# Patient Record
Sex: MALE | Race: WHITE | URBAN - METROPOLITAN AREA
[De-identification: names, ages, dates, MRNs, and addresses within clinical notes are randomized per-mention and may not be internally consistent; named-entity substitution may affect disease eponyms.]

---

## 2020-12-02 ENCOUNTER — APPOINTMENT (RX ONLY)
Dept: URBAN - METROPOLITAN AREA CLINIC 66 | Facility: CLINIC | Age: 20
Setting detail: DERMATOLOGY
End: 2020-12-02

## 2020-12-02 DIAGNOSIS — L81.4 OTHER MELANIN HYPERPIGMENTATION: ICD-10-CM

## 2020-12-02 DIAGNOSIS — L40.0 PSORIASIS VULGARIS: ICD-10-CM

## 2020-12-02 PROCEDURE — ? PRESCRIPTION

## 2020-12-02 PROCEDURE — ? COUNSELING

## 2020-12-02 PROCEDURE — 99202 OFFICE O/P NEW SF 15 MIN: CPT

## 2020-12-02 PROCEDURE — ? FULL BODY SKIN EXAM - DECLINED

## 2020-12-02 RX ORDER — CALCIPOTRIENE AND BETAMETHASONE DIPROPIONATE 50; .5 UG/G; MG/G
AEROSOL, FOAM TOPICAL QD
Qty: 1 | Refills: 6 | Status: ERX | COMMUNITY
Start: 2020-12-02

## 2020-12-02 RX ADMIN — CALCIPOTRIENE AND BETAMETHASONE DIPROPIONATE: 50; .5 AEROSOL, FOAM TOPICAL at 00:00

## 2020-12-02 ASSESSMENT — LOCATION ZONE DERM
LOCATION ZONE: SCALP
LOCATION ZONE: FACE

## 2020-12-02 ASSESSMENT — LOCATION DETAILED DESCRIPTION DERM
LOCATION DETAILED: RIGHT INFERIOR CENTRAL MALAR CHEEK
LOCATION DETAILED: MID-FRONTAL SCALP

## 2020-12-02 ASSESSMENT — LOCATION SIMPLE DESCRIPTION DERM
LOCATION SIMPLE: ANTERIOR SCALP
LOCATION SIMPLE: RIGHT CHEEK

## 2022-10-04 ENCOUNTER — APPOINTMENT (RX ONLY)
Dept: URBAN - METROPOLITAN AREA CLINIC 77 | Facility: CLINIC | Age: 22
Setting detail: DERMATOLOGY
End: 2022-10-04

## 2022-10-04 DIAGNOSIS — L40.0 PSORIASIS VULGARIS: ICD-10-CM | Status: INADEQUATELY CONTROLLED

## 2022-10-04 PROCEDURE — 99204 OFFICE O/P NEW MOD 45 MIN: CPT

## 2022-10-04 PROCEDURE — ? PRESCRIPTION MEDICATION MANAGEMENT

## 2022-10-04 PROCEDURE — ? CHRONOLOGY OF PRESENT ILLNESS

## 2022-10-04 PROCEDURE — ? PRESCRIPTION

## 2022-10-04 PROCEDURE — ? COUNSELING

## 2022-10-04 RX ORDER — HYDROCORTISONE 25 MG/G
THIN LAYER CREAM TOPICAL BID
Qty: 30 | Refills: 2 | Status: ERX

## 2022-10-04 RX ORDER — CLOBETASOL PROPIONATE 0.5 MG/ML
THIN LAYER SOLUTION TOPICAL BID
Qty: 50 | Refills: 2 | Status: ERX

## 2022-10-04 ASSESSMENT — LOCATION DETAILED DESCRIPTION DERM
LOCATION DETAILED: LEFT SUPERIOR MEDIAL FOREHEAD
LOCATION DETAILED: POSTERIOR MID-PARIETAL SCALP
LOCATION DETAILED: LEFT MEDIAL MALAR CHEEK

## 2022-10-04 ASSESSMENT — LOCATION SIMPLE DESCRIPTION DERM
LOCATION SIMPLE: LEFT CHEEK
LOCATION SIMPLE: POSTERIOR SCALP
LOCATION SIMPLE: LEFT FOREHEAD

## 2022-10-04 ASSESSMENT — LOCATION ZONE DERM
LOCATION ZONE: FACE
LOCATION ZONE: SCALP

## 2022-10-04 ASSESSMENT — PGA PSORIASIS: PGA PSORIASIS 2020: MODERATE

## 2022-10-04 ASSESSMENT — BSA PSORIASIS: % BODY COVERED IN PSORIASIS: 3

## 2022-10-04 NOTE — PROCEDURE: CHRONOLOGY OF PRESENT ILLNESS
Chronology Of Present Illness: 10/4/22\\nPatient reports present for 3 years. He has tried Enstilar foam in the past for six months which helped, but stopped when he was on oral prednisone because it cleared it all up. He notes after stopping the prednisone he flared and it is now spreading to the face, ears, and minimally on the chest. On exam, diffuse erythematous plaques predominate and there are guttate papules on  forehead trailing down to his central face. Discussed treatment with topical steroid medications Clobetasol scalp solution and Hydrocortisone 2.5% cream for the face and ears. If flare still present at next visit will discuss biologics. Will re-examine in 1 month.
Detail Level: Zone

## 2022-10-04 NOTE — PROCEDURE: PRESCRIPTION MEDICATION MANAGEMENT
Initiate Treatment: clobetasol 0.05 % scalp solution. Apply thin layer to AA of the scalp BID x 2 weeks, then 2 times per week for maintenance\\n\\nhydrocortisone 2.5 % topical cream. Apply thin layer to affected area of face and ears BID x 2 weeks. Then decrease to PRN as needed for flares.
Detail Level: Zone
Render In Strict Bullet Format?: No

## 2022-11-07 ENCOUNTER — APPOINTMENT (RX ONLY)
Dept: URBAN - METROPOLITAN AREA CLINIC 77 | Facility: CLINIC | Age: 22
Setting detail: DERMATOLOGY
End: 2022-11-07

## 2022-11-07 DIAGNOSIS — L21.8 OTHER SEBORRHEIC DERMATITIS: ICD-10-CM

## 2022-11-07 DIAGNOSIS — L40.0 PSORIASIS VULGARIS: ICD-10-CM

## 2022-11-07 PROCEDURE — ? PRESCRIPTION MEDICATION MANAGEMENT

## 2022-11-07 PROCEDURE — ? PRESCRIPTION

## 2022-11-07 PROCEDURE — ? CHRONOLOGY OF PRESENT ILLNESS

## 2022-11-07 PROCEDURE — 99214 OFFICE O/P EST MOD 30 MIN: CPT

## 2022-11-07 PROCEDURE — ? COUNSELING

## 2022-11-07 RX ORDER — HYDROCORTISONE 25 MG/G
THIN LAYER CREAM TOPICAL BID
Qty: 30 | Refills: 2 | Status: ERX

## 2022-11-07 RX ORDER — KETOCONAZOLE 20 MG/G
THIN LAYER CREAM TOPICAL QD
Qty: 60 | Refills: 2 | Status: ERX

## 2022-11-07 RX ORDER — CLOBETASOL PROPIONATE 0.5 MG/ML
THIN LAYER SOLUTION TOPICAL BID
Qty: 50 | Refills: 2 | Status: ERX

## 2022-11-07 RX ORDER — KETOCONAZOLE 20 MG/ML
AS DIRECTED SHAMPOO, SUSPENSION TOPICAL QD
Qty: 120 | Refills: 8 | Status: ERX

## 2022-11-07 ASSESSMENT — BSA PSORIASIS: % BODY COVERED IN PSORIASIS: 5

## 2022-11-07 ASSESSMENT — LOCATION SIMPLE DESCRIPTION DERM
LOCATION SIMPLE: POSTERIOR SCALP
LOCATION SIMPLE: ANTERIOR SCALP
LOCATION SIMPLE: LEFT CHEEK
LOCATION SIMPLE: LEFT FOREHEAD

## 2022-11-07 ASSESSMENT — LOCATION DETAILED DESCRIPTION DERM
LOCATION DETAILED: LEFT MEDIAL MALAR CHEEK
LOCATION DETAILED: MID-FRONTAL SCALP
LOCATION DETAILED: POSTERIOR MID-PARIETAL SCALP
LOCATION DETAILED: LEFT SUPERIOR MEDIAL FOREHEAD

## 2022-11-07 ASSESSMENT — LOCATION ZONE DERM
LOCATION ZONE: FACE
LOCATION ZONE: SCALP

## 2022-11-07 ASSESSMENT — PGA PSORIASIS: PGA PSORIASIS 2020: MILD

## 2022-11-07 NOTE — PROCEDURE: CHRONOLOGY OF PRESENT ILLNESS
Chronology Of Present Illness: 11/7/22\\nPatient reports doing better but not clear. Some yellow scaling on exam in scalp and along nasolabial folds appearing to be seborrheic dermatitis. Instructed to continue Clobetasol and Hydrocortisone 2x per week. Will prescribe new medications to add on for seb derm. If not significantly better at next visit, will try Vtama cream for lesions on upper forehead. \\n\\n10/4/22 initial visit:\\nPatient reports present for 3 years. He has tried Enstilar foam in the past for six months which helped, but stopped when he was on oral prednisone because it cleared it all up. He notes after stopping the prednisone he flared and it is now spreading to the face, ears, and minimally on the chest. On exam, diffuse erythematous plaques predominate and there are guttate papules on  forehead trailing down to his central face. Discussed treatment with topical steroid medications Clobetasol scalp solution and Hydrocortisone 2.5% cream for the face and ears. If flare still present at next visit will discuss biologics. Will re-examine in 1 month.
Detail Level: Zone

## 2022-11-07 NOTE — PROCEDURE: PRESCRIPTION MEDICATION MANAGEMENT
Detail Level: Zone
Render In Strict Bullet Format?: No
Plan: -\\nclobetasol 0.05 % scalp solution. Apply thin layer to AA of the scalp BID x 2 weeks, then 2 times per week for maintenance\\n\\nhydrocortisone 2.5 % topical cream. Apply thin layer to affected area of face and ears BID x 2 weeks, then 2x per week for maintenance.

## 2022-12-12 ENCOUNTER — APPOINTMENT (RX ONLY)
Dept: URBAN - METROPOLITAN AREA CLINIC 77 | Facility: CLINIC | Age: 22
Setting detail: DERMATOLOGY
End: 2022-12-12

## 2022-12-12 DIAGNOSIS — L40.0 PSORIASIS VULGARIS: ICD-10-CM

## 2022-12-12 DIAGNOSIS — L21.8 OTHER SEBORRHEIC DERMATITIS: ICD-10-CM | Status: IMPROVED

## 2022-12-12 PROCEDURE — ? ORDER TESTS

## 2022-12-12 PROCEDURE — ? CHRONOLOGY OF PRESENT ILLNESS

## 2022-12-12 PROCEDURE — ? PRESCRIPTION MEDICATION MANAGEMENT

## 2022-12-12 PROCEDURE — ? COUNSELING

## 2022-12-12 PROCEDURE — 99214 OFFICE O/P EST MOD 30 MIN: CPT

## 2022-12-12 ASSESSMENT — LOCATION SIMPLE DESCRIPTION DERM
LOCATION SIMPLE: POSTERIOR SCALP
LOCATION SIMPLE: LEFT FOREHEAD
LOCATION SIMPLE: LEFT CHEEK
LOCATION SIMPLE: ANTERIOR SCALP

## 2022-12-12 ASSESSMENT — LOCATION ZONE DERM
LOCATION ZONE: FACE
LOCATION ZONE: SCALP

## 2022-12-12 ASSESSMENT — LOCATION DETAILED DESCRIPTION DERM
LOCATION DETAILED: LEFT MEDIAL MALAR CHEEK
LOCATION DETAILED: POSTERIOR MID-PARIETAL SCALP
LOCATION DETAILED: LEFT SUPERIOR MEDIAL FOREHEAD
LOCATION DETAILED: MID-FRONTAL SCALP

## 2022-12-12 NOTE — PROCEDURE: PRESCRIPTION MEDICATION MANAGEMENT
Detail Level: Zone
Discontinue Regimen: hydrocortisone 2.5 % topical cream. Apply thin layer to affected area of face and ears BID x 2 weeks, then 2x per week for maintenance.
Render In Strict Bullet Format?: No
Plan: -\\nclobetasol 0.05 % scalp solution.  Apply thin layer to AA of the scalp BID x 2 weeks, then 2 times per week for maintenance
Continue Regimen: Ketoconazole shampoo and ketoconazole cream

## 2022-12-12 NOTE — PROCEDURE: ORDER TESTS
Bill For Surgical Tray: no
Billing Type: [employer]
Performing Laboratory: 0
Expected Date Of Service: 12/12/2022

## 2022-12-12 NOTE — PROCEDURE: CHRONOLOGY OF PRESENT ILLNESS
Chronology Of Present Illness: 11/7/22\\nPatient reports doing better but not clear. Some yellow scaling on exam in scalp and along nasolabial folds appearing to be seborrheic dermatitis. Instructed to continue Clobetasol and Hydrocortisone 2x per week. Will prescribe new medications to add on for seb derm. If not significantly better at next visit, will try Vtama cream for lesions on upper forehead. \\n\\n10/4/22 initial visit:\\nPatient reports present for 3 years. He has tried Enstilar foam in the past for six months which helped, but stopped when he was on oral prednisone because it cleared it all up. He notes after stopping the prednisone he flared and it is now spreading to the face, ears, and minimally on the chest. On exam, diffuse erythematous plaques predominate and there are guttate papules on  forehead trailing down to his central face. Discussed treatment with topical steroid medications Clobetasol scalp solution and Hydrocortisone 2.5% cream for the face and ears. If flare still present at next visit will discuss biologics. Will re-examine in 1 month. \\n\\n12/12/22\\nMinimal improvement from last visit with topical steroid use. Discussed Rhodia Gera in detail and patient would like to move forward. Patient was given VTAMA samples to try QD and continue Clobetasol scalp solution, ketoconazole shampoo. he will discontinue hydrocortisone. Briefly discussed biologics. Will send for labs and discuss further at next visit if Rhodia Gera  use shows no improvement. F/u in 2 months.
Detail Level: Zone

## 2023-05-24 ENCOUNTER — APPOINTMENT (RX ONLY)
Dept: URBAN - METROPOLITAN AREA CLINIC 77 | Facility: CLINIC | Age: 23
Setting detail: DERMATOLOGY
End: 2023-05-24

## 2023-05-24 DIAGNOSIS — L40.0 PSORIASIS VULGARIS: ICD-10-CM

## 2023-05-24 PROCEDURE — ? COUNSELING

## 2023-05-24 PROCEDURE — 99214 OFFICE O/P EST MOD 30 MIN: CPT

## 2023-05-24 PROCEDURE — ? PRESCRIPTION

## 2023-05-24 PROCEDURE — ? CHRONOLOGY OF PRESENT ILLNESS

## 2023-05-24 PROCEDURE — ? PRESCRIPTION MEDICATION MANAGEMENT

## 2023-05-24 RX ORDER — CLOBETASOL PROPIONATE 0.5 MG/ML
AS DIRECTED SOLUTION TOPICAL BID
Qty: 50 | Refills: 2 | Status: ERX

## 2023-05-24 RX ORDER — DESONIDE 0.5 MG/G
AS DIRECTED CREAM TOPICAL BID X 2 WEEKS
Qty: 60 | Refills: 0 | Status: ERX

## 2023-05-24 RX ORDER — CALCIPOTRIENE 0.05 MG/G
AS DIRECTED CREAM TOPICAL BID
Qty: 60 | Refills: 4 | Status: ERX

## 2023-05-24 RX ORDER — CLOBETASOL PROPIONATE 0.5 MG/G
AS DIRECTED CREAM TOPICAL BID
Qty: 60 | Refills: 4 | Status: ERX

## 2023-05-24 ASSESSMENT — LOCATION DETAILED DESCRIPTION DERM
LOCATION DETAILED: LEFT ANTERIOR PROXIMAL THIGH
LOCATION DETAILED: RIGHT VENTRAL PROXIMAL FOREARM
LOCATION DETAILED: POSTERIOR MID-PARIETAL SCALP
LOCATION DETAILED: RIGHT PROXIMAL PRETIBIAL REGION
LOCATION DETAILED: LEFT MEDIAL MALAR CHEEK
LOCATION DETAILED: RIGHT ANTERIOR DISTAL THIGH
LOCATION DETAILED: LEFT SUPERIOR MEDIAL FOREHEAD
LOCATION DETAILED: PERIUMBILICAL SKIN
LOCATION DETAILED: LEFT VENTRAL PROXIMAL FOREARM
LOCATION DETAILED: LEFT PROXIMAL PRETIBIAL REGION

## 2023-05-24 ASSESSMENT — LOCATION ZONE DERM
LOCATION ZONE: ARM
LOCATION ZONE: LEG
LOCATION ZONE: TRUNK
LOCATION ZONE: FACE
LOCATION ZONE: SCALP

## 2023-05-24 ASSESSMENT — LOCATION SIMPLE DESCRIPTION DERM
LOCATION SIMPLE: LEFT THIGH
LOCATION SIMPLE: POSTERIOR SCALP
LOCATION SIMPLE: RIGHT THIGH
LOCATION SIMPLE: LEFT FOREARM
LOCATION SIMPLE: LEFT FOREHEAD
LOCATION SIMPLE: RIGHT PRETIBIAL REGION
LOCATION SIMPLE: RIGHT FOREARM
LOCATION SIMPLE: LEFT CHEEK
LOCATION SIMPLE: LEFT PRETIBIAL REGION
LOCATION SIMPLE: ABDOMEN

## 2023-05-24 ASSESSMENT — BSA PSORIASIS: % BODY COVERED IN PSORIASIS: 18

## 2023-05-24 ASSESSMENT — ITCH NUMERIC RATING SCALE: HOW SEVERE IS YOUR ITCHING?: 3

## 2023-05-24 NOTE — PROCEDURE: CHRONOLOGY OF PRESENT ILLNESS
Chronology Of Present Illness: 11/7/22\\nPatient reports doing better but not clear. Some yellow scaling on exam in scalp and along nasolabial folds appearing to be seborrheic dermatitis. Instructed to continue Clobetasol and Hydrocortisone 2x per week. Will prescribe new medications to add on for seb derm. If not significantly better at next visit, will try Vtama cream for lesions on upper forehead. \\n\\n10/4/22 initial visit:\\nPatient reports present for 3 years. He has tried Enstilar foam in the past for six months which helped, but stopped when he was on oral prednisone because it cleared it all up. He notes after stopping the prednisone he flared and it is now spreading to the face, ears, and minimally on the chest. On exam, diffuse erythematous plaques predominate and there are guttate papules on  forehead trailing down to his central face. Discussed treatment with topical steroid medications Clobetasol scalp solution and Hydrocortisone 2.5% cream for the face and ears. If flare still present at next visit will discuss biologics. Will re-examine in 1 month. \\n\\n12/12/22\\nMinimal improvement from last visit with topical steroid use. Discussed Everet Sinks in detail and patient would like to move forward. Patient was given VTAMA samples to try QD and continue Clobetasol scalp solution, ketoconazole shampoo. he will discontinue hydrocortisone. Briefly discussed biologics. Will send for labs and discuss further at next visit if Everet Sinks  use shows no improvement. F/u in 2 months. \\n\\n5/24/23\\nWorsening today. Patient is now flaring up on legs and arms in addition to face and scalp. Will prescribe Clobetasol cream/ Mixed with Calcipotriene cream BID x 2 weeks (Body), Desonide cream/ Mixed with Calcipotriene cream BID x 2 weeks (Face), and Clobetasol solution BID x 2 weeks (Scalp). Discussed other treatment options such as Ramond Platts, Skyrizi, and Carolina. Will follow up in 1 month.
Detail Level: Zone

## 2023-05-24 NOTE — PROCEDURE: PRESCRIPTION MEDICATION MANAGEMENT
Detail Level: Zone
Render In Strict Bullet Format?: No
Plan: -\\n\\nFACE:\\nDesonide 0.05 % topical cream: Apply thin layer to AA of the face Twice daily x 2 weeks. Mix with calcipotriene. \\n\\nSCALP:\\nClobetasol 0.05 % scalp solution: Use small amount to AA of scalp Twice daily x 2 weeks\\n\\nBODY:\\nClobetasol 0.05 % topical cream: Apply thin layer to AA of body BID x 2 weeks. Mix with calcipotriene. \\n\\nFACE and BODY:\\nCalcipotriene 0.005 % topical cream: Apply thin layer on AA of body and face Twice daily x 4 weeks.

## 2023-08-01 ENCOUNTER — APPOINTMENT (RX ONLY)
Dept: URBAN - METROPOLITAN AREA CLINIC 77 | Facility: CLINIC | Age: 23
Setting detail: DERMATOLOGY
End: 2023-08-01

## 2023-08-01 DIAGNOSIS — L40.0 PSORIASIS VULGARIS: ICD-10-CM

## 2023-08-01 PROCEDURE — ? ORDER TESTS

## 2023-08-01 PROCEDURE — 99214 OFFICE O/P EST MOD 30 MIN: CPT

## 2023-08-01 PROCEDURE — ? COUNSELING

## 2023-08-01 PROCEDURE — ? CHRONOLOGY OF PRESENT ILLNESS

## 2023-08-01 PROCEDURE — ? PRESCRIPTION

## 2023-08-01 PROCEDURE — ? PRESCRIPTION MEDICATION MANAGEMENT

## 2023-08-01 RX ORDER — TAPINAROF 10 MG/1000MG
THIN LAYER CREAM TOPICAL QD
Qty: 60 | Refills: 1 | Status: ERX | COMMUNITY
Start: 2023-08-01

## 2023-08-01 RX ADMIN — TAPINAROF THIN LAYER: 10 CREAM TOPICAL at 00:00

## 2023-08-01 ASSESSMENT — LOCATION SIMPLE DESCRIPTION DERM
LOCATION SIMPLE: LEFT FOREARM
LOCATION SIMPLE: LEFT PRETIBIAL REGION
LOCATION SIMPLE: POSTERIOR SCALP
LOCATION SIMPLE: RIGHT UPPER ARM
LOCATION SIMPLE: RIGHT PRETIBIAL REGION
LOCATION SIMPLE: RIGHT THIGH
LOCATION SIMPLE: LEFT CHEEK
LOCATION SIMPLE: LEFT UPPER ARM
LOCATION SIMPLE: RIGHT FOREARM
LOCATION SIMPLE: LEFT FOREHEAD
LOCATION SIMPLE: LEFT THIGH

## 2023-08-01 ASSESSMENT — LOCATION DETAILED DESCRIPTION DERM
LOCATION DETAILED: LEFT ANTERIOR PROXIMAL UPPER ARM
LOCATION DETAILED: RIGHT PROXIMAL PRETIBIAL REGION
LOCATION DETAILED: LEFT VENTRAL PROXIMAL FOREARM
LOCATION DETAILED: LEFT PROXIMAL PRETIBIAL REGION
LOCATION DETAILED: RIGHT VENTRAL DISTAL FOREARM
LOCATION DETAILED: RIGHT ANTERIOR PROXIMAL UPPER ARM
LOCATION DETAILED: LEFT ANTERIOR DISTAL THIGH
LOCATION DETAILED: RIGHT ANTERIOR DISTAL THIGH
LOCATION DETAILED: LEFT SUPERIOR MEDIAL FOREHEAD
LOCATION DETAILED: LEFT MEDIAL MALAR CHEEK
LOCATION DETAILED: POSTERIOR MID-PARIETAL SCALP

## 2023-08-01 ASSESSMENT — LOCATION ZONE DERM
LOCATION ZONE: SCALP
LOCATION ZONE: ARM
LOCATION ZONE: LEG
LOCATION ZONE: FACE

## 2023-08-01 NOTE — PROCEDURE: ORDER TESTS
Billing Type: [employer]
Bill For Surgical Tray: no
Expected Date Of Service: 08/01/2023
Performing Laboratory: 0

## 2023-08-01 NOTE — PROCEDURE: PRESCRIPTION MEDICATION MANAGEMENT
Initiate Treatment: Vtama
Detail Level: Zone
Continue Regimen: Ketoconazole shampoo\\nClobetasol solution twice weekly to the scalp
Render In Strict Bullet Format?: No

## 2023-08-01 NOTE — PROCEDURE: CHRONOLOGY OF PRESENT ILLNESS
Chronology Of Present Illness: 11/7/22\\nPatient reports doing better but not clear. Some yellow scaling on exam in scalp and along nasolabial folds appearing to be seborrheic dermatitis. Instructed to continue Clobetasol and Hydrocortisone 2x per week. Will prescribe new medications to add on for seb derm. If not significantly better at next visit, will try Vtama cream for lesions on upper forehead. \\n\\n10/4/22 initial visit:\\nPatient reports present for 3 years. He has tried Enstilar foam in the past for six months which helped, but stopped when he was on oral prednisone because it cleared it all up. He notes after stopping the prednisone he flared and it is now spreading to the face, ears, and minimally on the chest. On exam, diffuse erythematous plaques predominate and there are guttate papules on  forehead trailing down to his central face. Discussed treatment with topical steroid medications Clobetasol scalp solution and Hydrocortisone 2.5% cream for the face and ears. If flare still present at next visit will discuss biologics. Will re-examine in 1 month. \\n\\n12/12/22\\nMinimal improvement from last visit with topical steroid use. Discussed Emil Kenneth in detail and patient would like to move forward. Patient was given VTAMA samples to try QD and continue Clobetasol scalp solution, ketoconazole shampoo. he will discontinue hydrocortisone. Briefly discussed biologics. Will send for labs and discuss further at next visit if Emil Legions  use shows no improvement. F/u in 2 months. \\n\\n5/24/23\\nWorsening today. Patient is now flaring up on legs and arms in addition to face and scalp. Will prescribe Clobetasol cream/ Mixed with Calcipotriene cream BID x 2 weeks (Body), Desonide cream/ Mixed with Calcipotriene cream BID x 2 weeks (Face), and Clobetasol solution BID x 2 weeks (Scalp). Discussed other treatment options such as Chastity Sheng, Skyrizi, and Carolina. Will follow up in 1 month. \\n\\n8/1/23\\nPatient flaring on the face, arms, legs, and scalp today. He notes he was using topical steroids and calcipotriene with no improvement, has since stopped. Based off of body surface area, Patient is a candidate for biologics. We discussed skyrizi vs tremfya in detail. Discussed otezla as an oral option as well. He notes he did research on all of these and is open to starting, but states he was completely clear while using VTAMA, but once he stopped he flared again. I discussed with patient Emil Legions is to be used daily until clear and then to be used PRN for flares. He would like to try Emil Legions again before moving forward with a systemic drug. Mom is also here today and notes patient has kidney disease. In remission, but history of. Will prescribe Vtama and get initial labs. F/u in 1 month. Anticipate skyrizi  paperwork if still flared at next appt.
Detail Level: Zone

## 2024-05-16 RX ORDER — TAPINAROF 10 MG/1000MG
THIN LAYER CREAM TOPICAL QD
Qty: 60 | Refills: 0 | Status: ERX